# Patient Record
Sex: FEMALE | Race: WHITE
[De-identification: names, ages, dates, MRNs, and addresses within clinical notes are randomized per-mention and may not be internally consistent; named-entity substitution may affect disease eponyms.]

---

## 2017-10-20 ENCOUNTER — HOSPITAL ENCOUNTER (OUTPATIENT)
Dept: HOSPITAL 39 - LAB.O | Age: 67
Discharge: HOME | End: 2017-10-20
Attending: NURSE PRACTITIONER
Payer: MEDICARE

## 2017-10-20 DIAGNOSIS — E03.9: ICD-10-CM

## 2017-10-20 DIAGNOSIS — I10: ICD-10-CM

## 2017-10-20 DIAGNOSIS — E53.8: Primary | ICD-10-CM

## 2017-10-20 DIAGNOSIS — R73.03: ICD-10-CM

## 2017-10-20 DIAGNOSIS — E78.2: ICD-10-CM

## 2017-10-24 ENCOUNTER — HOSPITAL ENCOUNTER (OUTPATIENT)
Dept: HOSPITAL 39 - RAD | Age: 67
Discharge: HOME | End: 2017-10-24
Attending: NURSE PRACTITIONER
Payer: MEDICARE

## 2017-10-24 DIAGNOSIS — M25.561: Primary | ICD-10-CM

## 2017-10-24 NOTE — RAD
EXAM DESCRIPTION: 



Knee,Right Complete



CLINICAL HISTORY: 



PAIN IN RIGHT KNEE



COMPARISON: 



None.



TECHNIQUE: 



4 views right



FINDINGS: 



Mild loss of medial joint space is observed. Small joint effusion

is seen. No fracturing is detected.



IMPRESSION: 



Mild degenerative changes are observed most pronounced the medial

joint compartment.



Electronically signed by:  Jose Rodriguez MD  10/24/2017 3:48 PM

[facility] Workstation: [id number]

## 2017-11-03 ENCOUNTER — HOSPITAL ENCOUNTER (OUTPATIENT)
Dept: HOSPITAL 39 - RAD | Age: 67
Discharge: HOME | End: 2017-11-03
Attending: ORTHOPAEDIC SURGERY
Payer: MEDICARE

## 2017-11-03 DIAGNOSIS — M25.551: Primary | ICD-10-CM

## 2017-11-05 NOTE — RAD
Procedure:  XR PELVIS 1-2 VIEWS        



Exam Date:  11/3/2017 12:00 AM CDT



Ordering Provider:  GEETA RIOS



Clinical Indication:  PAIN IN RIGHT HIP



Comparison: None



FINDINGS:  

There is no fracture or dislocation. 



Articular surface of each hip has a normal appearance. 

The sacroiliac joints have a normal appearance bilaterally. 

The pubic symphysis is normal. 

There are no lytic or sclerotic lesions. 

There are no suspicious calcifications.



Impression:

1. Negative exam of the pelvis and each hip.



Electronically signed by:  Eitan Quintero MD  11/5/2017 8:28 AM

[facility] Workstation: [id number]

## 2017-11-08 ENCOUNTER — HOSPITAL ENCOUNTER (OUTPATIENT)
Dept: HOSPITAL 39 - MRI | Age: 67
End: 2017-11-08
Attending: ORTHOPAEDIC SURGERY
Payer: MEDICARE

## 2017-11-08 DIAGNOSIS — S83.206A: Primary | ICD-10-CM

## 2017-11-09 NOTE — MRI
EXAM DESCRIPTION: Knee,Right



CLINICAL HISTORY: MENISCUS TEAR RIGHT KNEE



COMPARISON: None Available.



TECHNIQUE: MRI of the right knee is performed according to our

usual protocol with multiplanar multi sequence imaging.



FINDINGS: 

Moderate right knee joint effusion.

Osteochondral fracture central weightbearing portion medial

femoral condyle related to full-thickness vertical tear of the

medial meniscus at the posterior central attachment with related

medial subluxation of the medial meniscus. Volga-shaped

fracture line involves an area of about 10 mm and there is

diffuse hazy reactive marrow edema throughout the medial femoral

condyle. Through the region of the fracture there is slight

flattening of the surface. There is diffuse grade 2/3 chondrosis

throughout the medial compartment.



Advanced chronic patellofemoral arthritis with extensive mixed

grade 3/4 chondrosis. There is a 15 mm area of grade 4 chondrosis

in the lateral aspect of the trochlea.



Cruciate and collateral ligaments intact. Lateral meniscus

intact.



IMPRESSION: 

1.  Full-thickness tear medial meniscus at the posterior central

root with medial subluxation of the meniscus and related

osteochondral fracture central weightbearing portion medial

femoral condyle

2.  Advanced severe patellofemoral arthritis



Electronically signed by:  Harris Cook MD  11/9/2017 11:44 AM

[facility] Workstation: [id number]

## 2018-01-15 ENCOUNTER — HOSPITAL ENCOUNTER (OUTPATIENT)
Dept: HOSPITAL 39 - YCFC.O | Age: 68
Discharge: HOME | End: 2018-01-15
Attending: NURSE PRACTITIONER
Payer: MEDICARE

## 2018-01-15 DIAGNOSIS — R50.9: Primary | ICD-10-CM

## 2018-03-29 ENCOUNTER — HOSPITAL ENCOUNTER (OUTPATIENT)
Dept: HOSPITAL 39 - RESP | Age: 68
End: 2018-03-29
Attending: NURSE PRACTITIONER
Payer: MEDICARE

## 2018-03-29 DIAGNOSIS — F31.32: ICD-10-CM

## 2018-03-29 DIAGNOSIS — E66.9: Primary | ICD-10-CM

## 2018-05-19 ENCOUNTER — HOSPITAL ENCOUNTER (EMERGENCY)
Dept: HOSPITAL 39 - ER | Age: 68
Discharge: HOME | End: 2018-05-19
Payer: MEDICARE

## 2018-05-19 VITALS — OXYGEN SATURATION: 95 % | TEMPERATURE: 98.8 F | SYSTOLIC BLOOD PRESSURE: 154 MMHG | DIASTOLIC BLOOD PRESSURE: 76 MMHG
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
## 2018-05-19 DIAGNOSIS — E07.9: ICD-10-CM

## 2018-05-19 DIAGNOSIS — I10: ICD-10-CM

## 2018-05-19 DIAGNOSIS — K58.9: ICD-10-CM
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
## 2018-05-19 DIAGNOSIS — S30.1XXA: Primary | ICD-10-CM
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
## 2018-11-29 ENCOUNTER — HOSPITAL ENCOUNTER (OUTPATIENT)
Dept: HOSPITAL 39 - LAB.O | Age: 68
End: 2018-11-29
Attending: NURSE PRACTITIONER
Payer: MEDICARE

## 2018-11-29 DIAGNOSIS — E78.2: Primary | ICD-10-CM

## 2018-11-29 DIAGNOSIS — D50.9: ICD-10-CM

## 2018-11-29 DIAGNOSIS — E55.9: ICD-10-CM

## 2018-11-29 DIAGNOSIS — D51.8: ICD-10-CM

## 2018-11-29 DIAGNOSIS — I10: ICD-10-CM

## 2018-11-29 DIAGNOSIS — R73.03: ICD-10-CM

## 2018-11-29 DIAGNOSIS — E03.9: ICD-10-CM

## 2020-01-22 ENCOUNTER — HOSPITAL ENCOUNTER (OUTPATIENT)
Dept: HOSPITAL 39 - YCFC.O | Age: 70
End: 2020-01-22
Attending: FAMILY MEDICINE
Payer: MEDICARE

## 2020-01-22 DIAGNOSIS — R30.9: Primary | ICD-10-CM

## 2020-01-22 DIAGNOSIS — R31.9: ICD-10-CM
